# Patient Record
Sex: MALE | Race: WHITE
[De-identification: names, ages, dates, MRNs, and addresses within clinical notes are randomized per-mention and may not be internally consistent; named-entity substitution may affect disease eponyms.]

---

## 2022-12-08 ENCOUNTER — HOSPITAL ENCOUNTER (EMERGENCY)
Dept: HOSPITAL 26 - MED | Age: 52
LOS: 1 days | Discharge: HOME | End: 2022-12-09
Payer: COMMERCIAL

## 2022-12-08 VITALS — WEIGHT: 300 LBS | HEIGHT: 71 IN | BODY MASS INDEX: 42 KG/M2

## 2022-12-08 VITALS — SYSTOLIC BLOOD PRESSURE: 159 MMHG | DIASTOLIC BLOOD PRESSURE: 103 MMHG
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
## 2022-12-08 DIAGNOSIS — M25.532: Primary | ICD-10-CM

## 2022-12-09 ENCOUNTER — HOSPITAL ENCOUNTER (EMERGENCY)
Dept: HOSPITAL 26 - MED | Age: 52
Discharge: HOME | End: 2022-12-09
Payer: COMMERCIAL

## 2022-12-09 VITALS — WEIGHT: 300 LBS | BODY MASS INDEX: 42 KG/M2 | HEIGHT: 71 IN

## 2022-12-09 VITALS — SYSTOLIC BLOOD PRESSURE: 138 MMHG | DIASTOLIC BLOOD PRESSURE: 98 MMHG

## 2022-12-09 VITALS — SYSTOLIC BLOOD PRESSURE: 135 MMHG | DIASTOLIC BLOOD PRESSURE: 75 MMHG
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
## 2022-12-09 DIAGNOSIS — S62.115A: Primary | ICD-10-CM
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
## 2022-12-09 NOTE — NUR
Patient discharged with v/s stable. Written and verbal after care instructions 
ABOUT WRIST FRACTURE TREATED WITH IMMOBILIZATION given and explained. 

Patient verbalized understanding. Ambulatory with steady gait. All questions 
addressed prior to discharge. Advised to follow up with PMD.
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
